# Patient Record
Sex: FEMALE | Race: WHITE | URBAN - METROPOLITAN AREA
[De-identification: names, ages, dates, MRNs, and addresses within clinical notes are randomized per-mention and may not be internally consistent; named-entity substitution may affect disease eponyms.]

---

## 2021-02-20 ENCOUNTER — HOSPITAL ENCOUNTER (OUTPATIENT)
Dept: OBSTETRICS AND GYNECOLOGY | Facility: HOSPITAL | Age: 41
End: 2021-02-21
Attending: OBSTETRICS & GYNECOLOGY | Admitting: OBSTETRICS & GYNECOLOGY

## 2021-02-20 LAB
ABS NEUT (OLG): 5.3 X10(3)/MCL (ref 2.1–9.2)
ALBUMIN SERPL-MCNC: 3.6 GM/DL (ref 3.5–5)
ALBUMIN/GLOB SERPL: 1.2 RATIO (ref 1.1–2)
ALP SERPL-CCNC: 81 UNIT/L (ref 40–150)
ALT SERPL-CCNC: 23 UNIT/L (ref 0–55)
APPEARANCE, UA: CLEAR
APTT PPP: 27.7 SECOND(S) (ref 23.2–33.7)
AST SERPL-CCNC: 13 UNIT/L (ref 5–34)
BACTERIA SPEC CULT: ABNORMAL /HPF
BASOPHILS # BLD AUTO: 0 X10(3)/MCL (ref 0–0.2)
BASOPHILS NFR BLD AUTO: 0 %
BILIRUB SERPL-MCNC: 0.2 MG/DL
BILIRUB UR QL STRIP: NEGATIVE
BILIRUBIN DIRECT+TOT PNL SERPL-MCNC: 0.1 MG/DL (ref 0–0.5)
BILIRUBIN DIRECT+TOT PNL SERPL-MCNC: 0.1 MG/DL (ref 0–0.8)
BUN SERPL-MCNC: 15 MG/DL (ref 7–18.7)
CALCIUM SERPL-MCNC: 9 MG/DL (ref 8.4–10.2)
CHLORIDE SERPL-SCNC: 108 MMOL/L (ref 98–107)
CO2 SERPL-SCNC: 24 MMOL/L (ref 22–29)
COLOR UR: YELLOW
CREAT SERPL-MCNC: 0.62 MG/DL (ref 0.55–1.02)
EOSINOPHIL # BLD AUTO: 0.2 X10(3)/MCL (ref 0–0.9)
EOSINOPHIL NFR BLD AUTO: 2 %
ERYTHROCYTE [DISTWIDTH] IN BLOOD BY AUTOMATED COUNT: 13 % (ref 11.5–17)
GLOBULIN SER-MCNC: 3 GM/DL (ref 2.4–3.5)
GLUCOSE (UA): NEGATIVE
GLUCOSE SERPL-MCNC: 95 MG/DL (ref 74–100)
GROUP & RH: NORMAL
HCT VFR BLD AUTO: 40.2 % (ref 37–47)
HGB BLD-MCNC: 13.2 GM/DL (ref 12–16)
HGB UR QL STRIP: ABNORMAL
INR PPP: 1 (ref 0–1.3)
KETONES UR QL STRIP: NEGATIVE
LDH SERPL-CCNC: 147 UNIT/L (ref 140–271)
LEUKOCYTE ESTERASE UR QL STRIP: NEGATIVE
LYMPHOCYTES # BLD AUTO: 4.1 X10(3)/MCL (ref 0.6–4.6)
LYMPHOCYTES NFR BLD AUTO: 40 %
MCH RBC QN AUTO: 29.5 PG (ref 27–31)
MCHC RBC AUTO-ENTMCNC: 32.8 GM/DL (ref 33–36)
MCV RBC AUTO: 89.7 FL (ref 80–94)
MONOCYTES # BLD AUTO: 0.5 X10(3)/MCL (ref 0.1–1.3)
MONOCYTES NFR BLD AUTO: 5 %
NEUTROPHILS # BLD AUTO: 5.3 X10(3)/MCL (ref 2.1–9.2)
NEUTROPHILS NFR BLD AUTO: 52 %
NITRITE UR QL STRIP: NEGATIVE
PH UR STRIP: 6 [PH] (ref 5–9)
PLATELET # BLD AUTO: 293 X10(3)/MCL (ref 130–400)
PMV BLD AUTO: 10.5 FL (ref 9.4–12.4)
POTASSIUM SERPL-SCNC: 3.8 MMOL/L (ref 3.5–5.1)
PROT SERPL-MCNC: 6.6 GM/DL (ref 6.4–8.3)
PROT UR QL STRIP: NEGATIVE
PROTHROMBIN TIME: 12.7 SECOND(S) (ref 11.1–13.7)
RBC # BLD AUTO: 4.48 X10(6)/MCL (ref 4.2–5.4)
RBC #/AREA URNS HPF: ABNORMAL /[HPF]
SODIUM SERPL-SCNC: 142 MMOL/L (ref 136–145)
SP GR UR STRIP: 1.01 (ref 1–1.03)
SQUAMOUS EPITHELIAL, UA: ABNORMAL
URATE SERPL-MCNC: 5.9 MG/DL (ref 2.6–6)
UROBILINOGEN UR STRIP-ACNC: 0.2
WBC # SPEC AUTO: 10.2 X10(3)/MCL (ref 4.5–11.5)
WBC #/AREA URNS HPF: ABNORMAL /[HPF]

## 2021-02-21 LAB
ABS NEUT (OLG): 6.94 X10(3)/MCL (ref 2.1–9.2)
APTT PPP: 26.3 SECOND(S) (ref 23.2–33.7)
BASOPHILS # BLD AUTO: 0 X10(3)/MCL (ref 0–0.2)
BASOPHILS NFR BLD AUTO: 0 %
EOSINOPHIL # BLD AUTO: 0.1 X10(3)/MCL (ref 0–0.9)
EOSINOPHIL NFR BLD AUTO: 1 %
ERYTHROCYTE [DISTWIDTH] IN BLOOD BY AUTOMATED COUNT: 13 % (ref 11.5–17)
HCT VFR BLD AUTO: 35.6 % (ref 37–47)
HGB BLD-MCNC: 11.8 GM/DL (ref 12–16)
INR PPP: 1 (ref 0–1.3)
LYMPHOCYTES # BLD AUTO: 3.4 X10(3)/MCL (ref 0.6–4.6)
LYMPHOCYTES NFR BLD AUTO: 30 %
MCH RBC QN AUTO: 30 PG (ref 27–31)
MCHC RBC AUTO-ENTMCNC: 33.1 GM/DL (ref 33–36)
MCV RBC AUTO: 90.6 FL (ref 80–94)
MONOCYTES # BLD AUTO: 0.6 X10(3)/MCL (ref 0.1–1.3)
MONOCYTES NFR BLD AUTO: 5 %
NEUTROPHILS # BLD AUTO: 6.94 X10(3)/MCL (ref 2.1–9.2)
NEUTROPHILS NFR BLD AUTO: 63 %
PLATELET # BLD AUTO: 255 X10(3)/MCL (ref 130–400)
PMV BLD AUTO: 10.5 FL (ref 9.4–12.4)
PROTHROMBIN TIME: 13.4 SECOND(S) (ref 11.1–13.7)
RBC # BLD AUTO: 3.93 X10(6)/MCL (ref 4.2–5.4)
WBC # SPEC AUTO: 11.1 X10(3)/MCL (ref 4.5–11.5)

## 2022-04-30 NOTE — H&P
Patient:   Jeanette Anderson             MRN: [id number]            FIN: [id number]               Age:   40 years     Sex:  Female     :  1980   Associated Diagnoses:   None   Author:   Toy Paul MD      Basic Information   Gestational Age:     Gestational Age (EGA) and RENEE     * Note: EGA calculated as of 2021     No EGA/RENEE calculations have been recorded  .       Chief Complaint   2021 20:41 CST      Post op vag delivery from 21, c/o vaginal bleeding and 9 golf ball clots        History of Present Illness   The patient presents with 10 weeks postpartum from .  Admitted for bleeding last night.  Patient is     Para Information:   : 5   Para Term: 2   Para : 0   Para Abortions: 0   Para Livin.        No prenatal exam data has been recorded.  .        Review of Systems   Constitutional:  Negative.    Eye:  Negative.    Respiratory:  Negative.    Cardiovascular:  Negative.    Breast   Gastrointestinal:  Negative.    Genitourinary:  Negative.    Gynecologic:  Negative.    Hematology/Lymphatics:  Negative.    Endocrine   Musculoskeletal:  Negative.    Integumentary:  Negative.    Neurologic:  Negative.    Psychiatric:  Negative.    All other systems are negative      Health Status   Allergies:    Allergic Reactions (Selected)  Severity Not Documented  Benadryl- Nose bleeds and tightening of calf muscles.   Current medications:  (Selected)   Inpatient Medications  Ordered  Lactated Ringers 1000ml 1,000 mL: 1,000 mL, 1,000 mL, IV, 125 mL/hr, start date 21 23:14:00 CST, 1.89, m2  Lactated Ringers 1000ml 1,000 mL: 1,000 mL, 1,000 mL, IV, 999 mL/hr, start date 21 23:14:00 CST, 1.89, m2  Percocet 5/325: 1 tab(s), form: Tab, Oral, q4hr PRN for pain, first dose 21 23:19:00 CST  Procardia 10 mg oral capsule: 10 mg, form: Cap, Oral, Once PRN for hypertension, first dose 21 23:53:00 CST  atropine-diphenoxylate: 1 tab(s), form: Tab, Oral,  q6hr PRN for loose stool, first dose 02/20/21 21:29:00 CST, Waste Code YESSY  ibuprofen 600 mg oral tablet: 600 mg, form: Tab, Oral, TID, first dose 02/20/21 23:00:00 CST  Documented Medications  Documented  Ibuprofen 800 mg tablet: 0 Refill(s)  RisperDAL: 0 Refill(s)  Vitamin D3: 0 Refill(s)  traMADol: 0 Refill(s)   Problem list:    All Problems  Anxiety / SNOMED CT [id number] / Confirmed  Neck problem / SNOMED CT 5V56D659-592N-[id number] / Confirmed  Tobacco user / SNOMED CT 874223104 / Probable  Resolved: Gestational diabetes mellitus, class A>1< / SNOMED CT 726836975  Problem added by Discern Expert  Resolved: Maternal tobacco use / SNOMED CT 9064075605  Problem added by Discern Expert  Resolved: Pregnant / SNOMED CT 478532797  Resolved: Pregnant / SNOMED CT 072993061  Resolved: Pregnant / SNOMED CT 333918566  Resolved: Pregnant / SNOMED CT 562325985  Resolved: Pregnant / SNOMED CT 339122952,    Problems   (Active Problems Only)    Neck problem.......   (SNOMED CT: [id number]-[id number], Onset: --)  Anxiety......   (SNOMED CT: WG08S362-8B10-6J64-6758-G0556T852HL4, Onset: --)  Tobacco user   (SNOMED CT: 382301800, Onset: --)        Histories      Prenatal History   Lab from [facility]   2/21/2021 6:00 CST       PT                        13.4 second(s)                             INR                       1.0                             PTT                       26.3 second(s)                             Fibrinogen                280.0 mg/dL                             WBC                       11.1 x10(3)/mcL                             RBC                       3.93 x10(6)/mcL  LOW                             Hgb                       11.8 gm/dL  LOW                             Hct                       35.6 %  LOW                             Platelet                  255 x10(3)/mcL                             MCV                       90.6 fL                              MCH                       30.0 pg                             MCHC                      33.1 gm/dL                             RDW                       13.0 %                             MPV                       10.5 fL                             Abs Neut                  6.94 x10(3)/mcL                             Neutro Auto               63 %  NA                             Lymph Auto                30 %  NA                             Mono Auto                 5 %  NA                             Eos Auto                  1 %  NA                             Abs Eos                   0.1 x10(3)/mcL                             Basophil Auto             0 %  NA                             Abs Neutro                6.94 x10(3)/mcL                             Abs Lymph                 3.4 x10(3)/mcL                             Abs Mono                  0.6 x10(3)/mcL                             Abs Baso                  0.0 x10(3)/mcL    2/20/2021 23:30 CST      Est Creat Clearance Ser   94.57 mL/min    2/20/2021 22:00 CST      Sodium Lvl                142 mmol/L                             Potassium Lvl             3.8 mmol/L                             Chloride                  108 mmol/L  HI                             CO2                       24 mmol/L                             Calcium Lvl               9.0 mg/dL                             Glucose Lvl               95 mg/dL                             BUN                       15.0 mg/dL                             Creatinine                0.62 mg/dL                             eGFR-AA                   >60  NA                             eGFR-GEN                  >60 mL/min/1.73 m2  NA                             Bili Total                0.2 mg/dL                             Bili Direct               0.1 mg/dL                             Bili Indirect             0.10 mg/dL                             AST                       13 unit/L                              ALT                       23 unit/L                             Alk Phos                  81 unit/L                             Total Protein             6.6 gm/dL                             Albumin Lvl               3.6 gm/dL                             Globulin                  3.0 gm/dL                             A/G Ratio                 1.2 ratio                             LDH                       147 unit/L                             Uric Acid                 5.9 mg/dL                             PT                        12.7 second(s)                             INR                       1.0                             PTT                       27.7 second(s)                             Fibrinogen                313.0 mg/dL                             WBC                       10.2 x10(3)/mcL                             RBC                       4.48 x10(6)/mcL                             Hgb                       13.2 gm/dL                             Hct                       40.2 %                             Platelet                  293 x10(3)/mcL                             MCV                       89.7 fL                             MCH                       29.5 pg                             MCHC                      32.8 gm/dL  LOW                             RDW                       13.0 %                             MPV                       10.5 fL                             Abs Neut                  5.30 x10(3)/mcL                             Neutro Auto               52 %  NA                             Lymph Auto                40 %  NA                             Mono Auto                 5 %  NA                             Eos Auto                  2 %  NA                             Abs Eos                   0.2 x10(3)/mcL                             Basophil Auto             0 %  NA                             Abs Neutro                5.30 x10(3)/mcL                              Abs Lymph                 4.1 x10(3)/mcL                             Abs Mono                  0.5 x10(3)/mcL                             Abs Baso                  0.0 x10(3)/mcL                             UA Appear                 CLEAR                             UA Color                  YELLOW                             UA Spec Grav              1.008                             UA Bili                   Negative                             UA pH                     6.0                             UA Urobilinogen           0.2                             UA Blood                  Trace                             UA Glucose                Negative                             UA Ketones                Negative                             UA Protein                Negative                             UA Nitrite                Negative                             UA Leuk Est               Negative                             UA WBC                    NONE SEEN                             UA RBC                    NONE SEEN                             UA Bacteria               NONE SEEN /HPF                             UA Squam Epithelial       NONE SEEN                             ABO/Rh                    A POS                             Antibody Screen           Neg     Procedure history:    D&C.  wisdom teeth.   Social History        Social & Psychosocial History  Social History   Tobacco     10 or more cigarettes (1/2 pack or more)/day in last 30 days, N/A   10 or more cigarettes (1/2 pack or more)/day in last 30 days, N/A   Current every day smoker, Cigarettes, 20 per day.     Abuse/Neglect     No, No, Yes   No, No, Yes    Psychosocial History   No active psychosocial history has been recorded.        Physical Examination   VS/Measurements   General:  Alert and oriented, No acute distress.    HENT:  Normocephalic.    Respiratory:  Respirations are non-labored, Symmetrical chest wall  expansion.    Cardiovascular:  Normal rate, Regular rhythm.    Gastrointestinal:  Soft, Non-tender, Non-distended, uterus firm.    Musculoskeletal:  Normal range of motion.    Integumentary:  Warm, Dry.    Neurologic:  Alert, Oriented.    Psychiatric:  Cooperative, Appropriate mood & affect.       Impression and Plan   Diagnosis     Post partum bleeding.     Plan     Admit.     Admitted overnight and given hemabate and cytotec.  Minimal bleeding since then.  US showed Blood clots and no difinitive POC, although this cannot be ruled out by US.  Coags stable and fibrinogen 280 this morning. .

## 2022-04-30 NOTE — ED PROVIDER NOTES
Patient:   Jeanette Anderson             MRN: [id number]            FIN: [id number]               Age:   40 years     Sex:  Female     :  1980   Associated Diagnoses:   None   Author:   Benton Wen MD      Basic Information   Additional information: Chief Complaint from Nursing Triage Note : Chief Complaint   2021 20:41 CST      Chief Complaint           Post op vag delivery from 21, c/o vaginal bleeding and 9 golf ball clots  .      History of Present Illness   The patient presents with (subjective data):   Primary OB Provider: Gerson Delgadillo MD  Pregnancy Status /Para info   OB Hx  OBHx : 5  OBHx Para: 2  OBHx Para Full Term: 2  OBHx Para Premature: 0  OBHx Para Abortions: 0  OBHx Living Children Pregnancy Hx: 2  OBHx Multiple Births Pregnancy: 0  OBHx Spontaneous Abortions Pregnancy: 0  OBHx Induced Abortions Pregnancy: 0  OBHx Ectopic Pregnancies Hx: 0.  Contractions: Contraction info ST   No qualifying data available.       40-year-old G2, P2 status post spontaneous vaginal delivery on 2021 came in today complaining of heavy bleeding that started at 3 PM this afternoon.  Patient states immediately after delivery she had some bleeding for the first 2 to 3 days afterwards bleeding became light.  Patient states for the last week bleeding has been very minimal slightly heavier bleeding after pumping.  Today she began to feel some pelvic pressure and started passing blood clots.  Patient states she is passed approximately 9 medium sized blood clots over the past 6 hours.  Patient denies uterine cramping.  On exam nursing staff did notice her pad was moderately soaked that she had placed prior to coming to the hospital.  Also on initial evaluation patient is noted to have elevated blood pressures.  Patient states that she does suffer with anxiety.  The patient does state that she began having elevated blood pressures towards the end of her pregnancy.  Currently  not on any medication.      Health Status   Allergies:    Allergic Reactions (All)  Severity Not Documented  Benadryl- Nose bleeds and tightening of calf muscles.,    Allergies (1) Active Reaction  Benadryl nose bleeds  .   Medications:  (Selected)   Inpatient Medications  Ordered  Hemabate: 250 mcg, form: Soln, IM, Once, first dose 02/20/21 21:28:00 CST, stop date 02/20/21 21:28:00 CST, STAT  atropine-diphenoxylate: 1 tab(s), form: Tab, Oral, q6hr PRN for loose stool, first dose 02/20/21 21:29:00 CST, Waste Code YESSY  Documented Medications  Documented  Ibuprofen 800 mg tablet: 0 Refill(s)  RisperDAL: 0 Refill(s)  Vitamin D3: 0 Refill(s)  traMADol: 0 Refill(s).      Review of Systems   General Well  HENT_normal  Respiratory_normal  Cardiovascular_normal  Gastrointestinal_normal  Genitourinary_normal  Musculoskeletal_normal  Integumentary_normal         Past Medical/ Family/ Social History   Medical history:    Active  Neck problem ([id number])  Anxiety ([id number])  Resolved  Gestational diabetes mellitus, class A>1< ([id number]): Onset on 2/9/2021 at 40 years.  Resolved on 2/9/2021 at 40 years.  Comments:  2/9/2021 CST 6:07 CST - SYSTEM  Problem added by Discern Expert  Maternal tobacco use ([id number]): Onset on 2/9/2021 at 40 years.  Resolved on 2/9/2021 at 40 years.  Comments:  2/9/2021 CST 6:07 CST - SYSTEM  Problem added by Discern Expert  Pregnant (740778988): Onset on 2/9/2020 at 39 years.  Resolved on 2/9/2021 at 40 years.  Pregnant (162016239): Onset on 5/9/2007 at 27 years.  Resolved on 2/13/2008 at 27 years.  Pregnant ([id number]):  Resolved in 1998 at 17 years.  Pregnant ([id number]):  Resolved in 1997 at 16 years.  Pregnant ([id number]):  Resolved in 1996 at 15 years..   Surgical history:    D&C.  wisdom teeth..   Family history:    Diabetes mellitus type 2  Mother  Hypertension.  Mother  Father  Brother  .   Social history:    Social & Psychosocial  Habits    Tobacco  07/21/2016  Use: Current every day smoker    Type: Cigarettes    Tobacco use per day: 20    02/09/2021  Use: 10 or more cigarettes (1/    Patient Wants Consult For Cessation Counseling N/A    Abuse/Neglect  02/09/2021  SHX Any signs of abuse or neglect No    Feels unsafe at home: No    Safe place to go: Yes  .   Problem list:    Active Problems (3)  Anxiety   Neck problem   Tobacco user   .      Physical Examination               Vital Signs   Vital Signs   2/20/2021 21:06 CST      Systolic Blood Pressure   142 mmHg  HI                             Diastolic Blood Pressure  75 mmHg                             Mean Arterial Pressure, Cuff              97 mmHg    2/20/2021 21:02 CST      Systolic Blood Pressure   152 mmHg  HI                             Diastolic Blood Pressure  74 mmHg                             Mean Arterial Pressure, Cuff              100 mmHg    2/20/2021 20:55 CST      Systolic Blood Pressure   150 mmHg  HI                             Diastolic Blood Pressure  76 mmHg                             Mean Arterial Pressure, Cuff              101 mmHg    2/20/2021 20:49 CST      Peripheral Pulse Rate     75 bpm                             Systolic Blood Pressure   157 mmHg  HI                             Diastolic Blood Pressure  75 mmHg                             Blood Pressure Location   Left arm    2/20/2021 20:41 CST      Temperature Temporal Artery               36.3 DegC                             Peripheral Pulse Rate     84 bpm                             Oxygen Therapy            Room air                             Systolic Blood Pressure   173 mmHg  HI                             Diastolic Blood Pressure  100 mmHg  HI                             Blood Pressure Location   Left arm                             Blood Pressure Cuff Size  Adult long  .      Vital Signs (last 24 hrs)_____  Last Charted___________  Heart Rate Peripheral   75 bpm  (FEB 20 20:49)  SBP      H  142mmHg  ( 21:06)  DBP      75 mmHg  ( 21:06)  .   Basic Oxygen Information   2021 20:41 CST      Oxygen Therapy            Room air  .   Genitourinary:  Vaginal status info ST   No qualifying data available, Membrane status info ST   No qualifying data available.    General- Well, appears as stated age  Abdomen- Soft NT no rebound/guarding  GYN- no urethral discharge, no vaginal or vulvar lesions, Uterus mobile non tender, uterus noted to be enlarged  Pelvic examSterile speculum exam performed.       -No vulvar lesions.       -Vaginal vault filled with blood and blood clots.  Vaginal vault evacuated of blood clots.  No active bleeding noted at this time.       -Cervical os noted to be open.       -Bimanual uterine massage was performed.  Lower extremitybilateral no edema or tenderness           Medical Decision Making   Tests pending:  Prenatal care info ST   No qualifying data available.   Results review:     No qualifying data available.      Reexamination/ Reevaluation   Vital signs   Basic Oxygen Information   2021 20:41 CST      Oxygen Therapy            Room air        Impression and Plan   40-year-old  status post spontaneous vaginal delivery 11 days ago, new onset heavy vaginal bleeding  1.  Transvaginal ultrasound ordered.  2.  CBC, CMP, LDH, uric acid, PT, PTT, fibrinogen ordered  3.  Hemabate given along with Lomotil  4.  Patient with continued bleeding, Cytotec given  5.  On-call physician called, patient admitted for observation.  Ultrasound and coags pending at this time

## 2022-05-05 NOTE — HISTORICAL [facility] CERNER
This is a historical note converted from Katheryn. Formatting and pictures may have been removed.  Please reference Katheryn for original formatting and attached multimedia. Admit and Discharge Dates  Admit Date: 02/20/2021  Discharge Date: 02/21/2021  Physicians  Attending Physician - Leona RODRIGUEZ, Gerson MCCALL  Admitting Physician - Leona RODRIGUEZ, Gerson MCCALL  Primary Care Physician - Marcial CHONG , Paz ITALIA  Discharge Diagnosis  Vaginal bleeding?[id number]  Surgical Procedures  No procedures recorded for this visit.  Immunizations  No immunizations recorded for this visit.  Admission Information  10 days postpartum with bleeding.  Significant Findings  Pt admitted overnight was given hemabate and cytotec.? Bleeding stopped and this morning her H/H was stable.  Time Spent on discharge  15 minutes  Objective  Vitals & Measurements  T:?37.0? ?C (Oral)? TMIN:?36.2? ?C (Temporal Artery)? TMAX:?37.0? ?C (Oral)? HR:?86(Peripheral)? HR:?75(Monitored)? RR:?18? BP:?113/53? WT:?78?kg?  Physical Exam  fundus firm  Patient Discharge Condition  stable  Discharge Disposition  home   Discharge Medication Reconciliation  Discharge Med Rec is not complete  Education and Orders Provided  Postpartum Hemorrhage  Discharge - 02/21/21 9:04:00 CST, Home, DC IV, return for any bleeding or problems?  Follow up  Gerson Delgadilol, on 03/18/2021  ????Call md office for possible earlier appt due to vaginal bleeding episodeKeep scheduled appointment  Car Seat Challenge  No Qualifying Data